# Patient Record
(demographics unavailable — no encounter records)

---

## 2025-01-22 NOTE — DISCUSSION/SUMMARY
[FreeTextEntry1] : 15 y/o female here for 1 week cough, 1 day nasal congestion and a few hours of L ear pain.   Well appearing, well hydrated, afebrile in office. Clear lungs upon auscultation. Unremarkable oropharynxc exam. L ear with normal landmarks + light reflex, slight fluid noted behind tm, no bulge, no purulence, no erythema Supportive care: Nasal saline, suction, humidifier as needed. Drink plenty of fluids. Fever reducing medications as needed. OTC cough suppressants not recommended in children under the age of 7. Can try Zarbee's or Manuel's herbal cough soothing syrup. Avoid those that have honey if under 1 year of age. Seek immediate attention for difficulty breathing, dehydration or prolonged fever (more than 5 days)/elevated fever (greater than 105). Check back if symptoms are prolonged, worsening ear pain or if new concerns arise. try zyrtec for post nasal cough and ear fluid, can also try flonase to dry up the mucous.   Parent/Guardian agree with and express understanding of plan.

## 2025-01-22 NOTE — PHYSICAL EXAM
[Acute Distress] : no acute distress [Alert] : alert [EOMI] : grossly EOMI [Conjuctival Injection] : no conjunctival injection [Clear] : right tympanic membrane clear [Erythema] : no erythema [Bulging] : not bulging [Purulent Effusion] : no purulent effusion [Clear Effusion] : clear effusion [Pink Nasal Mucosa] : pink nasal mucosa [Erythematous Oropharynx] : nonerythematous oropharynx [Enlarged Tonsils] : tonsils not enlarged [Exudate] : no exudate [Supple] : supple [Clear to Auscultation Bilaterally] : clear to auscultation bilaterally [Wheezing] : no wheezing [Rales] : no rales [Crackles] : no crackles [Tachypnea] : no tachypnea [Rhonchi] : no rhonchi [Regular Rate and Rhythm] : regular rate and rhythm [Moves All Extremities x 4] : moves all extremities x4 [Warm, Well Perfused x4] : warm, well perfused x4 [FreeTextEntry4] : + nasal congestion  [de-identified] : no rash

## 2025-01-22 NOTE — HISTORY OF PRESENT ILLNESS
[EENT/Resp Symptoms] : EENT/RESPIRATORY SYMPTOMS [Nasal congestion] : nasal congestion [___ Day(s)] : [unfilled] day(s) [Intermittent] : intermittent [Known Exposure to COVID-19] : no known exposure to COVID-19 [History of recent COVID-19 infection] : no history of recent COVID-19 infection [Sick Contacts: ___] : no sick contacts [Wet cough] : wet cough [Ibuprofen] : ibuprofen [Fever] : no fever [Change in sleep] : no change in sleep  [Malaise] : no malaise [Eye Redness] : no eye redness [Eye Discharge] : no eye discharge [Eye Itching] : no eye itching [Ear Pain] : ear pain [Runny Nose] : no runny nose [Nasal Congestion] : nasal congestion [Sore Throat] : no sore throat [Palpitations] : no palpitations [Chest Pain] : no chest pain [Cough] : cough [Wheezing] : no wheezing [SOB] : no shortness of breath [Tachypnea] : no tachypnea [Decreased Appetite] : no decreased appetite [Posttussive emesis] : no posttussive emesis [Vomiting] : no vomiting [Diarrhea] : no diarrhea [Decreased Urine Output] : no decreased urine output [Rash] : no rash [Myalgia] : no myalgia [Headache] : no headache [FreeTextEntry2] : cough [FreeTextEntry4] : helped ear pain, mucinex helped the congestion a little [FreeTextEntry6] : no fevers